# Patient Record
Sex: MALE | Race: WHITE | Employment: OTHER | URBAN - METROPOLITAN AREA
[De-identification: names, ages, dates, MRNs, and addresses within clinical notes are randomized per-mention and may not be internally consistent; named-entity substitution may affect disease eponyms.]

---

## 2020-09-11 ENCOUNTER — OFFICE VISIT (OUTPATIENT)
Dept: ORTHOPEDIC SURGERY | Age: 65
End: 2020-09-11
Payer: MEDICARE

## 2020-09-11 VITALS — HEIGHT: 67 IN | WEIGHT: 140 LBS | BODY MASS INDEX: 21.97 KG/M2

## 2020-09-11 PROCEDURE — G8427 DOCREV CUR MEDS BY ELIG CLIN: HCPCS | Performed by: ORTHOPAEDIC SURGERY

## 2020-09-11 PROCEDURE — 99203 OFFICE O/P NEW LOW 30 MIN: CPT | Performed by: ORTHOPAEDIC SURGERY

## 2020-09-11 PROCEDURE — G8420 CALC BMI NORM PARAMETERS: HCPCS | Performed by: ORTHOPAEDIC SURGERY

## 2020-09-11 NOTE — LETTER
[address]  Phone: [phone]  Fax: [phone]    DEREK Godoy -*        September 11, 2020       Patient: Sid Regalado   MR Number: [id number]   YOB: 1955   Date of Visit: 9/11/2020       Dear Dr. Luis Fernando Pfeiffer: Thank you for the request for consultation for Alise Lancaster to me for the evaluation of right shoulder pain. Below are the relevant portions of my assessment and plan of care. If you have questions, please do not hesitate to call me. I look forward to following Jumana Calhoun along with you.     Sincerely,      Kelly Grimaldo MD    CC providers:  DEREK Cronin - CNP  [address] [facility] [phone]  VIA Mail

## 2020-09-11 NOTE — PROGRESS NOTES
MD Brennan Zamorano, [place]         Orthopaedic Surgery and Sports Medicine      Patient Name: Viral Hester  YOB: 1955  Patient's PCP is No primary care provider on file. SUBJECTIVE  Chief Complaint:  Shoulder Pain (RIGHT)      History of Present Illness:  Viral Hester is a right handed 75 y.o. male here regarding right shoulder pain. Patient is here as a consult from his primary care provider, DEREK Pelayo CNP. The pain began approx 2months ago. There was not a history of injury. Patient reports he was diagnosed with polymyalgia rheumatica and was on prednisone 20 mg until about a week ago. He reports that he is diabetic and that did affect his blood sugar significantly. At this point, he is not taking any regular medication. He does take ibuprofen 600 mg at night as well as Tylenol throughout the day. Patient also reports in the past he was diagnosed with bilateral rotator cuff tears that were treated nonoperatively. He received a cortisone injection in his left side and did physical therapy bilaterally which seemed to help until just recently. Location: diffusely throughout the shoulder  Quality: aching and sharp   Pain Scale: 10/10  Context: overall course is worsening   Alleviating Factors: rest  Exacerbating Factors: elevation  Associated Symptoms: none    Sleep pattern is affected by the chief complaint: Yes  The patient has not had PT. The patient has had an injection many years ago on the left shoulder which helped a great deal.    The patient has taken NSAIDs, ibuprofen as needed. 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     Pain Assessment:  Pain Assessment  Location of Pain: Shoulder  Location Modifiers: Right  Severity of Pain: 10  Quality of Pain: Dull, Aching  Frequency of active forward flexion to approximately 110 degrees, active abduction to approximately 90 degrees. Stability and Special Testing:  Obriens test: not tested              Apprehension test: not tested              Load and Shift test: not tested                                    Impingement test: not tested                         Sulcus sign: not tested              Bear Hug test: not tested            Lift-Off test: not tested                   Cuff Drop Arm test:  positive    Strength: Forward flexion: 4+/5        Abduction: 4+/5        Internal Rotation: 5/5        External Rotation: 5/5    Neurologic: Sensation is intact to light touch throughout the median, ulnar and radial nerve distribution. Vascular: The bilateral upper extremities are warm and well-perfused with brisk capillary refill. Lymphatic: The lymphatic examination bilaterally reveals all areas to be without enlargement or induration    Skin: intact with no cellulitis, rashes, ulcerations, lymphedema or cutaneous lesions noted. Additional Examinations:  Left Upper Extremity: Examination of the left upper extremity does not show any tenderness, deformity or injury. Range of motion is within normal limits. There is no gross instability. There are no rashes, ulcerations or lesions. Strength and tone are normal.  Neck: Examination of the neck does not show any tenderness, deformity or injury. Range of motion is within normal limits. There is no gross instability. There are no rashes, ulcerations or lesions. Strength and tone are normal.      DIAGNOSTICS:  Xrays obtained in office today:  Yes  Xrays reviewed today: Yes  Four views of the right shoulder show   Fracture: No  Dislocation: No  Acromion morphology: Type II   Acromioclavicular joint arthritis: mild  Glenohumeral joint arthritis: mild  Humeral head superior migration: mild        ASSESSMENT (Medical Decision Making)    Corie Russell is a 75 y.o. male with the following diagnosis: Right shoulder likely rotator cuff tear      ICD-10-CM    1. Right shoulder pain, unspecified chronicity  M25.511 XR SHOULDER RIGHT (MIN 2 VIEWS)   2. Tear of right rotator cuff, unspecified tear extent, unspecified whether traumatic  M75. 101            PLAN (Medical Decision Making)  Office Procedures:  Orders Placed This Encounter   Procedures    XR SHOULDER RIGHT (MIN 2 VIEWS)       Treatment Plan:    I discussed the diagnosis and treatment options with Viral Hester today. At this time, would like to refer the patient for an MRI of his right shoulder to evaluate for rotator cuff tear. Patient will follow-up with us following his MRI we can discuss further treatment options at that time    Non-steroidal anti-inflammatories medications (NSAIDs) can be used to assist with pain control and to reduce inflammatory changes. These medications may be over-the-counter or prescribed. We discussed taking the NSAID properly and the precautions. The patient understands that this medication may potentially interfere with other medications. Patient was also instructed to immediately discontinue the medication is there is any possible complication. Viral Hester was instructed to call the office if his symptoms worsen or if new symptoms appear prior to the next scheduled visit. He is specifically instructed to contact the office between now and schedule appointment if he has concerns related to his condition or if he needs assistance in scheduling any above tests. He is welcome to call for an appointment sooner if he has any additional concerns or questions. Brennan Loja PA-C, scribing for and in the presence of Dr. Latisha Hillman   9/11/2020 10:37 AM    I, Dr. Tam Baxter, personally performed the services described in this documentation as scribed by Kenan Tate. DEION Amor in my presence, and it is both accurate and complete.     Tam Baxter MD      This dictation was performed with a verbal recognition program (DRAGON) and it was checked for errors. It is possible that there are still dictated errors within this office note. If so, please bring any errors to my attention for an addendum. All efforts were made to ensure that this office note is accurate.

## 2020-09-16 ENCOUNTER — HOSPITAL ENCOUNTER (OUTPATIENT)
Dept: MRI IMAGING | Age: 65
Discharge: HOME | End: 2020-09-16
Payer: MEDICARE

## 2020-09-16 PROCEDURE — 73221 MRI JOINT UPR EXTREM W/O DYE: CPT

## 2020-09-21 ENCOUNTER — OFFICE VISIT (OUTPATIENT)
Dept: ORTHOPEDIC SURGERY | Age: 65
End: 2020-09-21
Payer: MEDICARE

## 2020-09-21 VITALS — BODY MASS INDEX: 21.97 KG/M2 | HEIGHT: 67 IN | WEIGHT: 140 LBS

## 2020-09-21 PROCEDURE — 99213 OFFICE O/P EST LOW 20 MIN: CPT | Performed by: ORTHOPAEDIC SURGERY

## 2020-09-21 PROCEDURE — G8420 CALC BMI NORM PARAMETERS: HCPCS | Performed by: ORTHOPAEDIC SURGERY

## 2020-09-21 PROCEDURE — 1123F ACP DISCUSS/DSCN MKR DOCD: CPT | Performed by: ORTHOPAEDIC SURGERY

## 2020-09-21 PROCEDURE — G8428 CUR MEDS NOT DOCUMENT: HCPCS | Performed by: ORTHOPAEDIC SURGERY

## 2020-09-21 PROCEDURE — 4004F PT TOBACCO SCREEN RCVD TLK: CPT | Performed by: ORTHOPAEDIC SURGERY

## 2020-09-21 PROCEDURE — 4040F PNEUMOC VAC/ADMIN/RCVD: CPT | Performed by: ORTHOPAEDIC SURGERY

## 2020-09-21 PROCEDURE — 3017F COLORECTAL CA SCREEN DOC REV: CPT | Performed by: ORTHOPAEDIC SURGERY

## 2020-09-21 RX ORDER — MELOXICAM 15 MG/1
15 TABLET ORAL DAILY
Qty: 30 TABLET | Refills: 2 | Status: SHIPPED | OUTPATIENT
Start: 2020-09-21

## 2020-09-21 NOTE — PROGRESS NOTES
[employer]. Pain Assessment:  Pain Assessment  Location of Pain: Shoulder  Location Modifiers: Right  Severity of Pain: 3  Quality of Pain: Aching  Relieving Factors: Rest, Other (Comment)  Result of Injury: No  Work-Related Injury: No  Are there other pain locations you wish to document?: No    Review of Systems:  Sol Butterfield's review of systems has been performed by intake and observation. All past and current ROS forms have been scanned into the medical record. He has been instructed to contact his primary care provider regarding ROS issues if not already being addressed at this time. There are no recent changes. The most recent ROS was scanned into media on 09/11/2020    Past Medical History:  (see most recent intake form scanned into media on above date)  No past medical history on file. Past Surgical History:  (see most recent intake form scanned into media on above date)  No past surgical history on file. Allergies:  (see most recent intake form scanned into media on above date)  No Known Allergies    Medications:  (see most recent intake form scanned into media on above date)  Current Outpatient Medications   Medication Sig Dispense Refill    meloxicam (MOBIC) 15 MG tablet Take 1 tablet by mouth daily 30 tablet 2     No current facility-administered medications for this visit. Coagulation:  On a blood thinner: No  History of a bleeding disorder: No  History of a previous blood clot: No    Goal for treatment: Improve function and decrease pain    OBJECTIVE  PHYSICAL EXAM  Vital Signs: There were no vitals filed for this visit. Body mass index is 21.93 kg/m². General Appearance: Patient is adequately groomed with no evidence of malnutrition   Orientation: Patient is alert and oriented to person, place and time  Mood and Affect: Neutral/Euthymic(normal)  Gait and Station: normal    Right Shoulder Examination  Inspection:    Visual deformity noted: No    No swelling noted.      No erythema or ecchymosis. Palpation: no tenderness to palpation on the global region. Range of Motion:  limited by pain, active forward flexion to approximately 130 degrees, active abduction to approximately 120 degrees. Stability and Special Testing:  Obriens test: not tested              Apprehension test: not tested              Load and Shift test: not tested                                    Impingement test: not tested                         Sulcus sign: not tested              Bear Hug test: not tested            Lift-Off test: not tested                   Cuff Drop Arm test:  positive    Strength: Forward flexion: 4+/5        Abduction: 4+/5        Internal Rotation: 5/5        External Rotation: 5/5    Neurologic: Sensation is intact to light touch throughout the median, ulnar and radial nerve distribution. Vascular: The bilateral upper extremities are warm and well-perfused with brisk capillary refill. Lymphatic: The lymphatic examination bilaterally reveals all areas to be without enlargement or induration    Skin: intact with no cellulitis, rashes, ulcerations, lymphedema or cutaneous lesions noted. Additional Examinations:  Left Upper Extremity: Examination of the left upper extremity does not show any tenderness, deformity or injury. Range of motion is within normal limits. There is no gross instability. There are no rashes, ulcerations or lesions. Strength and tone are normal.  Neck: Examination of the neck does not show any tenderness, deformity or injury. Range of motion is within normal limits. There is no gross instability. There are no rashes, ulcerations or lesions.   Strength and tone are normal.      DIAGNOSTICS:  Xrays obtained in office today: No  Xrays reviewed today: Yes  Four views of the right shoulder show   Fracture: No  Dislocation: No  Acromion morphology: Type II   Acromioclavicular joint arthritis: mild  Glenohumeral joint arthritis: mild  Humeral head superior migration: mild        ASSESSMENT (Medical Decision Making)    Kirk Rudd is a 75 y.o. male with the following diagnosis: Right shoulder rotator cuff tendinitis      ICD-10-CM    1. Right rotator cuff tendinitis  M75.81            PLAN (Medical Decision Making)  Office Procedures:  No orders of the defined types were placed in this encounter. Treatment Plan:    I discussed the diagnosis and treatment options with Kirk Rudd today. At this time, would like to get the patient started on a regular anti-inflammatory and prescribed meloxicam 15 mg daily. We also went over home exercise program with him in the office today. He has done physical therapy in the past.  Patient will follow-up with us on a as needed basis    A home exercise program, specific to the patient, which includes stretching, strengthening, and range of motion training was instructed and initiated by the Certified Athletic Trainer as a part of their office visit today. Printed handouts were also provided of the instructions with illustrations for further explanation. This was performed under direct supervision of the treating clinician       Non-steroidal anti-inflammatories medications (NSAIDs) can be used to assist with pain control and to reduce inflammatory changes. These medications may be over-the-counter or prescribed. We discussed taking the NSAID properly and the precautions. The patient understands that this medication may potentially interfere with other medications. Patient was also instructed to immediately discontinue the medication is there is any possible complication. Kirk Rudd was instructed to call the office if his symptoms worsen or if new symptoms appear prior to the next scheduled visit. He is specifically instructed to contact the office between now and schedule appointment if he has concerns related to his condition or if he needs assistance in scheduling any above tests.  He is welcome to call for an appointment sooner if he has any additional concerns or questions. Nicole Caputo PA-C, scribing for and in the presence of Dr. Vanessa Barakat   9/21/2020 10:25 AM    I, Dr. Vincent Cortez, personally performed the services described in this documentation as scribed by Allen Rainey. DEION Amor in my presence, and it is both accurate and complete. Vincent Cortez MD      This dictation was performed with a verbal recognition program [facility]) and it was checked for errors. It is possible that there are still dictated errors within this office note. If so, please bring any errors to my attention for an addendum. All efforts were made to ensure that this office note is accurate.